# Patient Record
Sex: MALE | Race: WHITE | NOT HISPANIC OR LATINO | Employment: OTHER | URBAN - METROPOLITAN AREA
[De-identification: names, ages, dates, MRNs, and addresses within clinical notes are randomized per-mention and may not be internally consistent; named-entity substitution may affect disease eponyms.]

---

## 2024-01-19 ENCOUNTER — TELEPHONE (OUTPATIENT)
Dept: INTERNAL MEDICINE | Facility: CLINIC | Age: 74
End: 2024-01-19

## 2024-01-19 NOTE — TELEPHONE ENCOUNTER
Patient came into office to make a new patient appt, informed patient of next appt and would like to be seen sooner if possible due to medications. Patient states on medication for blood pressure and diabetes.

## 2024-02-08 ENCOUNTER — APPOINTMENT (OUTPATIENT)
Dept: GENERAL RADIOLOGY | Facility: HOSPITAL | Age: 74
End: 2024-02-08
Payer: MEDICARE

## 2024-02-08 ENCOUNTER — APPOINTMENT (OUTPATIENT)
Dept: CT IMAGING | Facility: HOSPITAL | Age: 74
End: 2024-02-08
Payer: MEDICARE

## 2024-02-08 ENCOUNTER — HOSPITAL ENCOUNTER (EMERGENCY)
Facility: HOSPITAL | Age: 74
Discharge: HOME | End: 2024-02-08
Attending: EMERGENCY MEDICINE | Admitting: EMERGENCY MEDICINE
Payer: MEDICARE

## 2024-02-08 VITALS
SYSTOLIC BLOOD PRESSURE: 168 MMHG | TEMPERATURE: 97.6 F | WEIGHT: 180 LBS | BODY MASS INDEX: 26.66 KG/M2 | RESPIRATION RATE: 16 BRPM | HEART RATE: 60 BPM | HEIGHT: 69 IN | OXYGEN SATURATION: 95 % | DIASTOLIC BLOOD PRESSURE: 86 MMHG

## 2024-02-08 DIAGNOSIS — R07.9 NONSPECIFIC CHEST PAIN: ICD-10-CM

## 2024-02-08 DIAGNOSIS — R40.0 SOMNOLENCE: Primary | ICD-10-CM

## 2024-02-08 LAB
ALBUMIN SERPL-MCNC: 4.2 G/DL (ref 3.5–5.2)
ALBUMIN/GLOB SERPL: 1.6 G/DL
ALP SERPL-CCNC: 38 U/L (ref 39–117)
ALT SERPL W P-5'-P-CCNC: 15 U/L (ref 1–41)
ANION GAP SERPL CALCULATED.3IONS-SCNC: 12.9 MMOL/L (ref 5–15)
AST SERPL-CCNC: 16 U/L (ref 1–40)
BASOPHILS # BLD AUTO: 0.04 10*3/MM3 (ref 0–0.2)
BASOPHILS NFR BLD AUTO: 0.6 % (ref 0–1.5)
BILIRUB SERPL-MCNC: 0.5 MG/DL (ref 0–1.2)
BILIRUB UR QL STRIP: NEGATIVE
BUN SERPL-MCNC: 22 MG/DL (ref 8–23)
BUN/CREAT SERPL: 24.7 (ref 7–25)
CALCIUM SPEC-SCNC: 9.6 MG/DL (ref 8.6–10.5)
CHLORIDE SERPL-SCNC: 104 MMOL/L (ref 98–107)
CLARITY UR: CLEAR
CO2 SERPL-SCNC: 25.1 MMOL/L (ref 22–29)
COLOR UR: ABNORMAL
CREAT SERPL-MCNC: 0.89 MG/DL (ref 0.76–1.27)
DEPRECATED RDW RBC AUTO: 39.8 FL (ref 37–54)
EGFRCR SERPLBLD CKD-EPI 2021: 91.6 ML/MIN/1.73
EOSINOPHIL # BLD AUTO: 0.11 10*3/MM3 (ref 0–0.4)
EOSINOPHIL NFR BLD AUTO: 1.7 % (ref 0.3–6.2)
ERYTHROCYTE [DISTWIDTH] IN BLOOD BY AUTOMATED COUNT: 12.5 % (ref 12.3–15.4)
GEN 5 2HR TROPONIN T REFLEX: 18 NG/L
GLOBULIN UR ELPH-MCNC: 2.7 GM/DL
GLUCOSE BLDC GLUCOMTR-MCNC: 150 MG/DL (ref 70–99)
GLUCOSE SERPL-MCNC: 145 MG/DL (ref 65–99)
GLUCOSE UR STRIP-MCNC: ABNORMAL MG/DL
HCT VFR BLD AUTO: 44 % (ref 37.5–51)
HGB BLD-MCNC: 15 G/DL (ref 13–17.7)
HGB UR QL STRIP.AUTO: NEGATIVE
HOLD SPECIMEN: NORMAL
HOLD SPECIMEN: NORMAL
IMM GRANULOCYTES # BLD AUTO: 0.01 10*3/MM3 (ref 0–0.05)
IMM GRANULOCYTES NFR BLD AUTO: 0.2 % (ref 0–0.5)
KETONES UR QL STRIP: NEGATIVE
LEUKOCYTE ESTERASE UR QL STRIP.AUTO: NEGATIVE
LIPASE SERPL-CCNC: 71 U/L (ref 13–60)
LYMPHOCYTES # BLD AUTO: 1.16 10*3/MM3 (ref 0.7–3.1)
LYMPHOCYTES NFR BLD AUTO: 18 % (ref 19.6–45.3)
MAGNESIUM SERPL-MCNC: 2 MG/DL (ref 1.6–2.4)
MCH RBC QN AUTO: 30.1 PG (ref 26.6–33)
MCHC RBC AUTO-ENTMCNC: 34.1 G/DL (ref 31.5–35.7)
MCV RBC AUTO: 88.2 FL (ref 79–97)
MONOCYTES # BLD AUTO: 0.49 10*3/MM3 (ref 0.1–0.9)
MONOCYTES NFR BLD AUTO: 7.6 % (ref 5–12)
NEUTROPHILS NFR BLD AUTO: 4.63 10*3/MM3 (ref 1.7–7)
NEUTROPHILS NFR BLD AUTO: 71.9 % (ref 42.7–76)
NITRITE UR QL STRIP: NEGATIVE
NRBC BLD AUTO-RTO: 0 /100 WBC (ref 0–0.2)
NT-PROBNP SERPL-MCNC: 354.7 PG/ML (ref 0–900)
PH UR STRIP.AUTO: 6 [PH] (ref 5–8)
PLATELET # BLD AUTO: 146 10*3/MM3 (ref 140–450)
PMV BLD AUTO: 10.8 FL (ref 6–12)
POTASSIUM SERPL-SCNC: 3.6 MMOL/L (ref 3.5–5.2)
PROT SERPL-MCNC: 6.9 G/DL (ref 6–8.5)
PROT UR QL STRIP: NEGATIVE
QT INTERVAL: 470 MS
QTC INTERVAL: 489 MS
RBC # BLD AUTO: 4.99 10*6/MM3 (ref 4.14–5.8)
SODIUM SERPL-SCNC: 142 MMOL/L (ref 136–145)
SP GR UR STRIP: 1.02 (ref 1–1.03)
TROPONIN T DELTA: -2 NG/L
TROPONIN T SERPL HS-MCNC: 20 NG/L
TSH SERPL DL<=0.05 MIU/L-ACNC: 0.55 UIU/ML (ref 0.27–4.2)
UROBILINOGEN UR QL STRIP: ABNORMAL
WBC NRBC COR # BLD AUTO: 6.44 10*3/MM3 (ref 3.4–10.8)
WHOLE BLOOD HOLD COAG: NORMAL
WHOLE BLOOD HOLD SPECIMEN: NORMAL

## 2024-02-08 PROCEDURE — 84443 ASSAY THYROID STIM HORMONE: CPT | Performed by: EMERGENCY MEDICINE

## 2024-02-08 PROCEDURE — 70450 CT HEAD/BRAIN W/O DYE: CPT

## 2024-02-08 PROCEDURE — 93005 ELECTROCARDIOGRAM TRACING: CPT

## 2024-02-08 PROCEDURE — 82948 REAGENT STRIP/BLOOD GLUCOSE: CPT

## 2024-02-08 PROCEDURE — 84484 ASSAY OF TROPONIN QUANT: CPT

## 2024-02-08 PROCEDURE — 25810000003 SODIUM CHLORIDE 0.9 % SOLUTION: Performed by: EMERGENCY MEDICINE

## 2024-02-08 PROCEDURE — 36415 COLL VENOUS BLD VENIPUNCTURE: CPT

## 2024-02-08 PROCEDURE — 71045 X-RAY EXAM CHEST 1 VIEW: CPT

## 2024-02-08 PROCEDURE — 85025 COMPLETE CBC W/AUTO DIFF WBC: CPT

## 2024-02-08 PROCEDURE — 93010 ELECTROCARDIOGRAM REPORT: CPT | Performed by: INTERNAL MEDICINE

## 2024-02-08 PROCEDURE — 83690 ASSAY OF LIPASE: CPT

## 2024-02-08 PROCEDURE — 83880 ASSAY OF NATRIURETIC PEPTIDE: CPT

## 2024-02-08 PROCEDURE — 81003 URINALYSIS AUTO W/O SCOPE: CPT | Performed by: EMERGENCY MEDICINE

## 2024-02-08 PROCEDURE — 93005 ELECTROCARDIOGRAM TRACING: CPT | Performed by: EMERGENCY MEDICINE

## 2024-02-08 PROCEDURE — 84484 ASSAY OF TROPONIN QUANT: CPT | Performed by: EMERGENCY MEDICINE

## 2024-02-08 PROCEDURE — 83735 ASSAY OF MAGNESIUM: CPT

## 2024-02-08 PROCEDURE — 99284 EMERGENCY DEPT VISIT MOD MDM: CPT

## 2024-02-08 PROCEDURE — 80053 COMPREHEN METABOLIC PANEL: CPT

## 2024-02-08 RX ORDER — GABAPENTIN 400 MG/1
800 CAPSULE ORAL ONCE
Status: COMPLETED | OUTPATIENT
Start: 2024-02-08 | End: 2024-02-08

## 2024-02-08 RX ORDER — SODIUM CHLORIDE 0.9 % (FLUSH) 0.9 %
10 SYRINGE (ML) INJECTION AS NEEDED
Status: DISCONTINUED | OUTPATIENT
Start: 2024-02-08 | End: 2024-02-08 | Stop reason: HOSPADM

## 2024-02-08 RX ORDER — ASPIRIN 81 MG/1
324 TABLET, CHEWABLE ORAL ONCE
Status: DISCONTINUED | OUTPATIENT
Start: 2024-02-08 | End: 2024-02-08 | Stop reason: HOSPADM

## 2024-02-08 RX ADMIN — GABAPENTIN 800 MG: 400 CAPSULE ORAL at 19:54

## 2024-02-08 RX ADMIN — SODIUM CHLORIDE 1000 ML: 9 INJECTION, SOLUTION INTRAVENOUS at 16:12

## 2024-02-08 NOTE — ED PROVIDER NOTES
Time: 3:37 PM EST  Date of encounter:  2024  Independent Historian/Clinical History and Information was obtained by:   Patient and Family    History is limited by: Altered Mental Status    Chief Complaint: Altered mental status, weakness and frequent falls      History of Present Illness:  Patient is a 73 y.o. year old male who presents to the emergency department for evaluation of altered mental status, weakness and frequent falls.  The patient's wife  1 year ago.  He is now staying with his daughter in the past month and all of the symptoms have been present the entire month.  Patient has a lot of chronic back pain and does take hydrocodone.  Daughter states he falls asleep frequently even during conversations.  Has generalized weakness and loss of muscle mass, slurring of his speech.    HPI    Patient Care Team  Primary Care Provider: Provider, No Known    Past Medical History:     Allergies   Allergen Reactions    Bee Venom Anaphylaxis    Morphine Anaphylaxis    Dilaudid [Hydromorphone Hcl] Delirium    Nsaids Hives    Tolmetin Unknown - Low Severity    Triprolidine-Pseudoephedrine Unknown - Low Severity     Past Medical History:   Diagnosis Date    Anemia     Chronic back pain     Diabetic neuropathy     Diverticulitis of colon     Esophageal reflux     Generalized anxiety disorder     Mixed hyperlipidemia     Rheumatoid arthritis     Syncope     Type 2 diabetes mellitus      Past Surgical History:   Procedure Laterality Date    INSERTION / REMOVAL EPIDURAL SPINAL NEUROSTIMULATOR       History reviewed. No pertinent family history.    Home Medications:  Prior to Admission medications    Not on File        Social History:   Social History     Tobacco Use    Smoking status: Former     Types: Cigarettes   Vaping Use    Vaping Use: Unknown   Substance Use Topics    Drug use: Defer         Review of Systems:  Review of Systems   Unable to perform ROS: Mental status change   Constitutional:  Positive for  "activity change and fatigue. Negative for fever.   HENT:  Negative for congestion.    Respiratory:  Negative for cough and shortness of breath.    Genitourinary:  Positive for difficulty urinating.   Musculoskeletal:  Positive for back pain.   Neurological:  Positive for speech difficulty and weakness.        Physical Exam:  /86   Pulse 60   Temp 97.6 °F (36.4 °C) (Oral)   Resp 16   Ht 175.3 cm (69\")   Wt 81.6 kg (180 lb)   SpO2 95%   BMI 26.58 kg/m²     Physical Exam  Vitals and nursing note reviewed.   Constitutional:       General: He is awake.      Appearance: Normal appearance. He is well-developed.      Comments: Patient is drowsy, consistently falling asleep during history and physical exam.   HENT:      Head: Normocephalic and atraumatic.      Nose: Nose normal.      Mouth/Throat:      Mouth: Mucous membranes are moist.   Eyes:      Extraocular Movements: Extraocular movements intact.      Pupils: Pupils are equal, round, and reactive to light.   Cardiovascular:      Rate and Rhythm: Normal rate and regular rhythm.      Heart sounds: Normal heart sounds.   Pulmonary:      Effort: Pulmonary effort is normal. No respiratory distress.      Breath sounds: Normal breath sounds. No wheezing, rhonchi or rales.   Abdominal:      General: Bowel sounds are normal.      Palpations: Abdomen is soft.      Tenderness: There is no abdominal tenderness. There is no guarding or rebound.      Comments: No rigidity   Musculoskeletal:         General: No tenderness. Normal range of motion.      Cervical back: Normal range of motion and neck supple.   Skin:     General: Skin is warm and dry.      Coloration: Skin is not jaundiced.   Neurological:      General: No focal deficit present.      Mental Status: Mental status is at baseline.                  Procedures:  Procedures      Medical Decision Making:      Comorbidities that affect care:    RA, type 2 diabetes, chronic back pain, generalized anxiety " disorder    External Notes reviewed:    Telephone Encounter: Telephone encounter 1/19/2024 with PMD, Dr. Rose.  Patient was calling in to try to get a quicker follow-up appointment for hypertension and diabetes.      The following orders were placed and all results were independently analyzed by me:  Orders Placed This Encounter   Procedures    XR Chest 1 View    CT Head Without Contrast    [place] Draw    High Sensitivity Troponin T    Comprehensive Metabolic Panel    Lipase    BNP    Magnesium    CBC Auto Differential    High Sensitivity Troponin T 2Hr    Urinalysis With Culture If Indicated - Urine, Clean Catch    TSH Rfx On Abnormal To Free T4    NPO Diet NPO Type: Strict NPO    Undress & Gown    Continuous Pulse Oximetry    Oxygen Therapy- Nasal Cannula; Titrate 1-6 LPM Per SpO2; 90 - 95%    POC Glucose Once    ECG 12 Lead ED Triage Standing Order; Chest Pain    ECG 12 Lead ED Triage Standing Order; Chest Pain    Insert Peripheral IV    CBC & Differential    Green Top (Gel)    Lavender Top    Gold Top - SST    Light Blue Top       Medications Given in the Emergency Department:  Medications   sodium chloride 0.9 % flush 10 mL (has no administration in time range)   aspirin chewable tablet 324 mg (324 mg Oral Not Given 2/8/24 1436)   gabapentin (NEURONTIN) capsule 800 mg (has no administration in time range)   sodium chloride 0.9 % bolus 1,000 mL (1,000 mL Intravenous New Bag 2/8/24 1612)        ED Course:    ED Course as of 02/08/24 1933   Thu Feb 08, 2024   1527 I have personally interpreted the EKG today and it shows no evidence of any acute ischemia or heart arrhythmia.  Right bundle branch block [RP]   1858 I have personally interpreted the EKG today and it shows no evidence of any acute ischemia or heart arrhythmia. [RP]   1931 Much more alert on reevaluation.  I did offer overnight observation but patient and family member at bedside are both comfortable going home knowing that we have ruled out  emergencies. [RP]      ED Course User Index  [RP] Glenn Cummins MD       Labs:    Lab Results (last 24 hours)       Procedure Component Value Units Date/Time    POC Glucose Once [[id number]]  (Abnormal) Collected: 02/08/24 1332    Specimen: Blood Updated: 02/08/24 1333     Glucose 150 mg/dL      Comment: Serial Number: [id number]:  [id number]       High Sensitivity Troponin T [[id number]]  (Normal) Collected: 02/08/24 1339    Specimen: Blood from Arm, Right Updated: 02/08/24 1417     HS Troponin T 20 ng/L     Narrative:      High Sensitive Troponin T Reference Range:  <14.0 ng/L- Negative Female for AMI  <22.0 ng/L- Negative Male for AMI  >=14 - Abnormal Female indicating possible myocardial injury.  >=22 - Abnormal Male indicating possible myocardial injury.   Clinicians would have to utilize clinical acumen, EKG, Troponin, and serial changes to determine if it is an Acute Myocardial Infarction or myocardial injury due to an underlying chronic condition.         CBC & Differential [[id number]]  (Abnormal) Collected: 02/08/24 1339    Specimen: Blood from Arm, Right Updated: 02/08/24 1352    Narrative:      The following orders were created for panel order CBC & Differential.  Procedure                               Abnormality         Status                     ---------                               -----------         ------                     CBC Auto Differential[[id number]]        Abnormal            Final result                 Please view results for these tests on the individual orders.    Comprehensive Metabolic Panel [[id number]]  (Abnormal) Collected: 02/08/24 1339    Specimen: Blood from Arm, Right Updated: 02/08/24 1417     Glucose 145 mg/dL      BUN 22 mg/dL      Creatinine 0.89 mg/dL      Sodium 142 mmol/L      Potassium 3.6 mmol/L      Chloride 104 mmol/L      CO2 25.1 mmol/L      Calcium 9.6 mg/dL      Total Protein 6.9 g/dL      Albumin 4.2 g/dL      ALT (SGPT) 15 U/L      AST (SGOT) 16 U/L       Alkaline Phosphatase 38 U/L      Total Bilirubin 0.5 mg/dL      Globulin 2.7 gm/dL      A/G Ratio 1.6 g/dL      BUN/Creatinine Ratio 24.7     Anion Gap 12.9 mmol/L      eGFR 91.6 mL/min/1.73     Narrative:      GFR Normal >60  Chronic Kidney Disease <60  Kidney Failure <15    The GFR formula is only valid for adults with stable renal function between ages 18 and 70.    Lipase [[id number]]  (Abnormal) Collected: 02/08/24 1339    Specimen: Blood from Arm, Right Updated: 02/08/24 1417     Lipase 71 U/L     BNP [[id number]]  (Normal) Collected: 02/08/24 1339    Specimen: Blood from Arm, Right Updated: 02/08/24 1414     proBNP 354.7 pg/mL     Narrative:      This assay is used as an aid in the diagnosis of individuals suspected of having heart failure. It can be used as an aid in the diagnosis of acute decompensated heart failure (ADHF) in patients presenting with signs and symptoms of ADHF to the emergency department (ED). In addition, NT-proBNP of <300 pg/mL indicates ADHF is not likely.    Age Range Result Interpretation  NT-proBNP Concentration (pg/mL:      <50             Positive            >450                   Gray                 300-450                    Negative             <300    50-75           Positive            >900                  Gray                300-900                  Negative            <300      >75             Positive            >1800                  Gray                300-1800                  Negative            <300    Magnesium [[id number]]  (Normal) Collected: 02/08/24 1339    Specimen: Blood from Arm, Right Updated: 02/08/24 1417     Magnesium 2.0 mg/dL     CBC Auto Differential [[id number]]  (Abnormal) Collected: 02/08/24 1339    Specimen: Blood from Arm, Right Updated: 02/08/24 1352     WBC 6.44 10*3/mm3      RBC 4.99 10*6/mm3      Hemoglobin 15.0 g/dL      Hematocrit 44.0 %      MCV 88.2 fL      MCH 30.1 pg      MCHC 34.1 g/dL      RDW 12.5 %      RDW-SD 39.8 fl      MPV 10.8 fL       Platelets 146 10*3/mm3      Neutrophil % 71.9 %      Lymphocyte % 18.0 %      Monocyte % 7.6 %      Eosinophil % 1.7 %      Basophil % 0.6 %      Immature Grans % 0.2 %      Neutrophils, Absolute 4.63 10*3/mm3      Lymphocytes, Absolute 1.16 10*3/mm3      Monocytes, Absolute 0.49 10*3/mm3      Eosinophils, Absolute 0.11 10*3/mm3      Basophils, Absolute 0.04 10*3/mm3      Immature Grans, Absolute 0.01 10*3/mm3      nRBC 0.0 /100 WBC     TSH Rfx On Abnormal To Free T4 [[id number]]  (Normal) Collected: 02/08/24 1339    Specimen: Blood from Arm, Right Updated: 02/08/24 1602     TSH 0.545 uIU/mL     High Sensitivity Troponin T 2Hr [[id number]]  (Normal) Collected: 02/08/24 1615    Specimen: Blood from Arm, Right Updated: 02/08/24 1640     HS Troponin T 18 ng/L      Troponin T Delta -2 ng/L     Narrative:      High Sensitive Troponin T Reference Range:  <14.0 ng/L- Negative Female for AMI  <22.0 ng/L- Negative Male for AMI  >=14 - Abnormal Female indicating possible myocardial injury.  >=22 - Abnormal Male indicating possible myocardial injury.   Clinicians would have to utilize clinical acumen, EKG, Troponin, and serial changes to determine if it is an Acute Myocardial Infarction or myocardial injury due to an underlying chronic condition.         Urinalysis With Culture If Indicated - Urine, Clean Catch [[id number]]  (Abnormal) Collected: 02/08/24 1815    Specimen: Urine, Clean Catch Updated: 02/08/24 1839     Color, UA Dark Yellow     Appearance, UA Clear     pH, UA 6.0     Specific Gravity, UA 1.025     Glucose, UA >=1000 mg/dL (3+)     Ketones, UA Negative     Bilirubin, UA Negative     Blood, UA Negative     Protein, UA Negative     Leuk Esterase, UA Negative     Nitrite, UA Negative     Urobilinogen, UA 1.0 E.U./dL    Narrative:      In absence of clinical symptoms, the presence of pyuria, bacteria, and/or nitrites on the urinalysis result does not correlate with infection.  Urine microscopic not indicated.              Imaging:    CT Head Without Contrast    Result Date: 2/8/2024  PROCEDURE: CT HEAD WO CONTRAST  COMPARISON:  None INDICATIONS: headache, multiple falls  PROTOCOL:   Standard imaging protocol performed    RADIATION:   DLP: 1017.2 mGy*cm   MA and/or KV was adjusted to minimize radiation dose.     TECHNIQUE: After obtaining the patient's consent, CT images were obtained without non-ionic intravenous contrast material.  FINDINGS:  Right frontal scalp hematoma.  No skull fracture.  No blood in the visualized paranasal sinuses/middle ear cavities.  No intraorbital hematoma.  Intracranially, no hemorrhage, edema, or mass effect.  Mild chronic small vessel ischemic white matter changes, and small old right basal ganglia lacunar infarct       No acute intracranial process     ZOYA SYKES MD       Electronically Signed and Approved By: ZOYA SYKES MD on 2/08/2024 at 16:40             XR Chest 1 View    Result Date: 2/8/2024  PROCEDURE: XR CHEST 1 VW  COMPARISON: None  INDICATIONS: Chest Pain Triage Protocol/chest pain and fatigue  FINDINGS:  Heart size and pulmonary vasculature within normal limits.  Thoracic spinal cord stimulator noted.  Lungs clear.  Costophrenic angles sharp       No active cardiopulmonary disease       ZOYA SYKES MD       Electronically Signed and Approved By: ZOYA SYKES MD on 2/08/2024 at 13:53                Differential Diagnosis and Discussion:    Weakness: Based on the patient's history, signs, and symptoms, the diffential diagnosis includes but is not limited to meningitis, stroke, sepsis, subarachnoid hemorrhage, intracranial bleeding, encephalitis, acute uti, dehydration, MS, myasthenia gravis, Guillan [place], migraine variant, neuromuscular disorders vertigo, electrolyte imbalance, and metabolic disorders.    All labs were reviewed and interpreted by me.  All X-rays impressions were independently interpreted by me.  EKG was interpreted by me.  CT scan radiology impression was  interpreted by me.    MDM     Amount and/or Complexity of Data Reviewed  Clinical lab tests: reviewed  Tests in the radiology section of CPT®: reviewed  Tests in the medicine section of CPT®: reviewed                 Patient Care Considerations:    MRI: I considered ordering an MRI however patient is much improved on reevaluation and at no point had any complaints of unilateral numbness tingling or focal weakness.  His history/physical exam are not consistent with CVA.      Consultants/Shared Management Plan:    None    Social Determinants of Health:    Patient is independent, reliable, and has access to care.       Disposition and Care Coordination:    Discharged: I considered escalation of care by admitting this patient to the hospital, however the patient has improved and is suitable and  stable for discharge.    I have explained the patient´s condition, diagnoses and treatment plan based on the information available to me at this time. I have answered questions and addressed any concerns. The patient has a good  understanding of the patient´s diagnosis, condition, and treatment plan as can be expected at this point. The vital signs have been stable. The patient´s condition is stable and appropriate for discharge from the emergency department.      The patient will pursue further outpatient evaluation with the primary care physician or other designated or consulting physician as outlined in the discharge instructions. They are agreeable to this plan of care and follow-up instructions have been explained in detail. The patient has received these instructions in written format and has expressed an understanding of the discharge instructions. The patient is aware that any significant change in condition or worsening of symptoms should prompt an immediate return to this or the closest emergency department or call to 911.    Final diagnoses:   Somnolence   Nonspecific chest pain        ED Disposition       ED  Disposition   Discharge    Condition   Stable    Comment   --               This medical record created using voice recognition software.             Glenn Cummins MD  02/08/24 2285

## 2024-02-09 LAB
QT INTERVAL: 435 MS
QTC INTERVAL: 444 MS

## 2024-02-09 NOTE — DISCHARGE INSTRUCTIONS
Return to the emergency department immediately for one-sided numbness tingling or loss of strength, worsening chest pain.  Stay well-hydrated.  Follow-up your primary care provider to discuss further outpatient testing or specialist referral for these issues.

## 2024-02-22 ENCOUNTER — OFFICE VISIT (OUTPATIENT)
Dept: INTERNAL MEDICINE | Facility: CLINIC | Age: 74
End: 2024-02-22
Payer: MEDICARE

## 2024-02-22 ENCOUNTER — PATIENT ROUNDING (BHMG ONLY) (OUTPATIENT)
Dept: INTERNAL MEDICINE | Facility: CLINIC | Age: 74
End: 2024-02-22
Payer: MEDICARE

## 2024-02-22 VITALS
HEART RATE: 78 BPM | WEIGHT: 179 LBS | OXYGEN SATURATION: 94 % | SYSTOLIC BLOOD PRESSURE: 130 MMHG | BODY MASS INDEX: 26.51 KG/M2 | TEMPERATURE: 97.3 F | DIASTOLIC BLOOD PRESSURE: 84 MMHG | HEIGHT: 69 IN

## 2024-02-22 DIAGNOSIS — N40.1 BPH ASSOCIATED WITH NOCTURIA: ICD-10-CM

## 2024-02-22 DIAGNOSIS — E11.43 AUTONOMIC DYSFUNCTION WITH TYPE 2 DIABETES MELLITUS: ICD-10-CM

## 2024-02-22 DIAGNOSIS — F32.A DEPRESSION, UNSPECIFIED DEPRESSION TYPE: ICD-10-CM

## 2024-02-22 DIAGNOSIS — R35.1 BPH ASSOCIATED WITH NOCTURIA: ICD-10-CM

## 2024-02-22 DIAGNOSIS — R41.3 MEMORY PROBLEM: ICD-10-CM

## 2024-02-22 DIAGNOSIS — G47.00 INSOMNIA, UNSPECIFIED TYPE: ICD-10-CM

## 2024-02-22 DIAGNOSIS — E11.65 TYPE 2 DIABETES MELLITUS WITH HYPERGLYCEMIA, UNSPECIFIED WHETHER LONG TERM INSULIN USE: ICD-10-CM

## 2024-02-22 DIAGNOSIS — I95.9 HYPOTENSION, UNSPECIFIED HYPOTENSION TYPE: ICD-10-CM

## 2024-02-22 DIAGNOSIS — G47.33 OSA (OBSTRUCTIVE SLEEP APNEA): ICD-10-CM

## 2024-02-22 DIAGNOSIS — Z76.89 ESTABLISHING CARE WITH NEW DOCTOR, ENCOUNTER FOR: Primary | ICD-10-CM

## 2024-02-22 DIAGNOSIS — M54.41 CHRONIC BILATERAL LOW BACK PAIN WITH BILATERAL SCIATICA: ICD-10-CM

## 2024-02-22 DIAGNOSIS — K21.9 GASTROESOPHAGEAL REFLUX DISEASE, UNSPECIFIED WHETHER ESOPHAGITIS PRESENT: ICD-10-CM

## 2024-02-22 DIAGNOSIS — Z72.0 CURRENT EVERY DAY NICOTINE VAPING: ICD-10-CM

## 2024-02-22 DIAGNOSIS — G89.29 CHRONIC BILATERAL LOW BACK PAIN WITH BILATERAL SCIATICA: ICD-10-CM

## 2024-02-22 DIAGNOSIS — R54 AGE-RELATED PHYSICAL DEBILITY: ICD-10-CM

## 2024-02-22 DIAGNOSIS — E66.3 OVERWEIGHT (BMI 25.0-29.9): ICD-10-CM

## 2024-02-22 DIAGNOSIS — I51.89 DIASTOLIC DYSFUNCTION: ICD-10-CM

## 2024-02-22 DIAGNOSIS — Z79.899 MEDICATION MANAGEMENT: ICD-10-CM

## 2024-02-22 DIAGNOSIS — M54.42 CHRONIC BILATERAL LOW BACK PAIN WITH BILATERAL SCIATICA: ICD-10-CM

## 2024-02-22 DIAGNOSIS — E78.5 HYPERLIPIDEMIA, UNSPECIFIED HYPERLIPIDEMIA TYPE: ICD-10-CM

## 2024-02-22 DIAGNOSIS — Z91.030 BEE STING ALLERGY: ICD-10-CM

## 2024-02-22 DIAGNOSIS — R29.6 FREQUENT FALLS: ICD-10-CM

## 2024-02-22 PROBLEM — I10 ESSENTIAL HYPERTENSION: Status: RESOLVED | Noted: 2024-02-22 | Resolved: 2024-02-22

## 2024-02-22 PROBLEM — I10 ESSENTIAL HYPERTENSION: Status: ACTIVE | Noted: 2024-02-22

## 2024-02-22 LAB
ALBUMIN SERPL-MCNC: 4.8 G/DL (ref 3.5–5.2)
ALBUMIN UR-MCNC: 5 MG/DL
ALBUMIN/GLOB SERPL: 2.2 G/DL
ALP SERPL-CCNC: 41 U/L (ref 39–117)
ALT SERPL W P-5'-P-CCNC: 14 U/L (ref 1–41)
AMPHET+METHAMPHET UR QL: NEGATIVE
AMPHETAMINE INTERNAL CONTROL: ABNORMAL
AMPHETAMINES UR QL: NEGATIVE
ANION GAP SERPL CALCULATED.3IONS-SCNC: 12 MMOL/L (ref 5–15)
AST SERPL-CCNC: 21 U/L (ref 1–40)
BARBITURATE INTERNAL CONTROL: ABNORMAL
BARBITURATES UR QL SCN: NEGATIVE
BENZODIAZ UR QL SCN: NEGATIVE
BENZODIAZEPINE INTERNAL CONTROL: ABNORMAL
BILIRUB SERPL-MCNC: 0.3 MG/DL (ref 0–1.2)
BUN SERPL-MCNC: 29 MG/DL (ref 8–23)
BUN/CREAT SERPL: 25 (ref 7–25)
BUPRENORPHINE INTERNAL CONTROL: ABNORMAL
BUPRENORPHINE SERPL-MCNC: NEGATIVE NG/ML
CALCIUM SPEC-SCNC: 9.6 MG/DL (ref 8.6–10.5)
CANNABINOIDS SERPL QL: NEGATIVE
CHLORIDE SERPL-SCNC: 106 MMOL/L (ref 98–107)
CO2 SERPL-SCNC: 25 MMOL/L (ref 22–29)
COCAINE INTERNAL CONTROL: ABNORMAL
COCAINE UR QL: NEGATIVE
CREAT SERPL-MCNC: 1.16 MG/DL (ref 0.76–1.27)
CREAT UR-MCNC: 88.5 MG/DL
EGFRCR SERPLBLD CKD-EPI 2021: 67.3 ML/MIN/1.73
EXPIRATION DATE: ABNORMAL
GLOBULIN UR ELPH-MCNC: 2.2 GM/DL
GLUCOSE SERPL-MCNC: 114 MG/DL (ref 65–99)
HBA1C MFR BLD: 6.7 % (ref 4.8–5.6)
Lab: ABNORMAL
MDMA (ECSTASY) INTERNAL CONTROL: ABNORMAL
MDMA UR QL SCN: NEGATIVE
METHADONE INTERNAL CONTROL: ABNORMAL
METHADONE UR QL SCN: NEGATIVE
METHAMPHETAMINE INTERNAL CONTROL: ABNORMAL
MICROALBUMIN/CREAT UR: 56.5 MG/G (ref 0–29)
MORPHINE INTERNAL CONTROL: ABNORMAL
MORPHINE/OPIATES SCREEN, URINE: POSITIVE
OXYCODONE INTERNAL CONTROL: ABNORMAL
OXYCODONE UR QL SCN: NEGATIVE
PCP UR QL SCN: NEGATIVE
PHENCYCLIDINE INTERNAL CONTROL: ABNORMAL
POTASSIUM SERPL-SCNC: 4.7 MMOL/L (ref 3.5–5.2)
PROT SERPL-MCNC: 7 G/DL (ref 6–8.5)
SODIUM SERPL-SCNC: 143 MMOL/L (ref 136–145)
THC INTERNAL CONTROL: ABNORMAL

## 2024-02-22 PROCEDURE — 83036 HEMOGLOBIN GLYCOSYLATED A1C: CPT | Performed by: STUDENT IN AN ORGANIZED HEALTH CARE EDUCATION/TRAINING PROGRAM

## 2024-02-22 PROCEDURE — 80053 COMPREHEN METABOLIC PANEL: CPT | Performed by: STUDENT IN AN ORGANIZED HEALTH CARE EDUCATION/TRAINING PROGRAM

## 2024-02-22 PROCEDURE — 82570 ASSAY OF URINE CREATININE: CPT | Performed by: STUDENT IN AN ORGANIZED HEALTH CARE EDUCATION/TRAINING PROGRAM

## 2024-02-22 PROCEDURE — 82043 UR ALBUMIN QUANTITATIVE: CPT | Performed by: STUDENT IN AN ORGANIZED HEALTH CARE EDUCATION/TRAINING PROGRAM

## 2024-02-22 RX ORDER — EPINEPHRINE 0.15 MG/.3ML
INJECTION INTRAMUSCULAR
COMMUNITY
End: 2024-02-22

## 2024-02-22 RX ORDER — GABAPENTIN 800 MG/1
800 TABLET ORAL 3 TIMES DAILY
COMMUNITY
End: 2024-02-22 | Stop reason: SDUPTHER

## 2024-02-22 RX ORDER — FENOFIBRATE 160 MG/1
160 TABLET ORAL DAILY
Qty: 90 TABLET | Refills: 2 | Status: SHIPPED | OUTPATIENT
Start: 2024-02-22

## 2024-02-22 RX ORDER — GABAPENTIN 800 MG/1
800 TABLET ORAL 3 TIMES DAILY
Qty: 90 TABLET | Refills: 2 | Status: SHIPPED | OUTPATIENT
Start: 2024-02-22

## 2024-02-22 RX ORDER — NALOXONE HYDROCHLORIDE 4 MG/.1ML
1 SPRAY NASAL AS NEEDED
COMMUNITY
End: 2024-02-22 | Stop reason: SDUPTHER

## 2024-02-22 RX ORDER — FOLIC ACID 1 MG/1
1 TABLET ORAL DAILY
Qty: 90 TABLET | Refills: 2 | Status: SHIPPED | OUTPATIENT
Start: 2024-02-22

## 2024-02-22 RX ORDER — HYDROCODONE BITARTRATE AND ACETAMINOPHEN 10; 325 MG/1; MG/1
1 TABLET ORAL EVERY 6 HOURS PRN
Qty: 30 TABLET | Refills: 0 | Status: SHIPPED | OUTPATIENT
Start: 2024-02-22

## 2024-02-22 RX ORDER — FOLIC ACID 1 MG/1
1 TABLET ORAL DAILY
COMMUNITY
End: 2024-02-22 | Stop reason: SDUPTHER

## 2024-02-22 RX ORDER — MIDODRINE HYDROCHLORIDE 2.5 MG/1
2.5 TABLET ORAL
COMMUNITY
End: 2024-02-22 | Stop reason: SDUPTHER

## 2024-02-22 RX ORDER — LEVOCETIRIZINE DIHYDROCHLORIDE 5 MG/1
5 TABLET, FILM COATED ORAL EVERY EVENING
COMMUNITY

## 2024-02-22 RX ORDER — TIZANIDINE 4 MG/1
6 TABLET ORAL NIGHTLY PRN
COMMUNITY
End: 2024-02-22 | Stop reason: SDUPTHER

## 2024-02-22 RX ORDER — PANTOPRAZOLE SODIUM 40 MG/1
40 TABLET, DELAYED RELEASE ORAL DAILY
Qty: 90 TABLET | Refills: 2 | Status: SHIPPED | OUTPATIENT
Start: 2024-02-22

## 2024-02-22 RX ORDER — ZOLPIDEM TARTRATE 12.5 MG/1
12.5 TABLET, FILM COATED, EXTENDED RELEASE ORAL NIGHTLY PRN
Qty: 30 TABLET | Refills: 2 | Status: SHIPPED | OUTPATIENT
Start: 2024-02-22

## 2024-02-22 RX ORDER — SILDENAFIL 25 MG/1
25 TABLET, FILM COATED ORAL DAILY PRN
COMMUNITY

## 2024-02-22 RX ORDER — FENOFIBRATE 160 MG/1
160 TABLET ORAL DAILY
COMMUNITY
End: 2024-02-22 | Stop reason: SDUPTHER

## 2024-02-22 RX ORDER — NALOXONE HYDROCHLORIDE 4 MG/.1ML
1 SPRAY NASAL AS NEEDED
Qty: 2 EACH | Refills: 2 | Status: SHIPPED | OUTPATIENT
Start: 2024-02-22

## 2024-02-22 RX ORDER — ROSUVASTATIN CALCIUM 10 MG/1
10 TABLET, COATED ORAL DAILY
Qty: 90 TABLET | Refills: 2 | Status: SHIPPED | OUTPATIENT
Start: 2024-02-22

## 2024-02-22 RX ORDER — MIDODRINE HYDROCHLORIDE 2.5 MG/1
2.5 TABLET ORAL
Qty: 270 TABLET | Refills: 2 | Status: SHIPPED | OUTPATIENT
Start: 2024-02-22

## 2024-02-22 RX ORDER — DULAGLUTIDE 3 MG/.5ML
4.5 INJECTION, SOLUTION SUBCUTANEOUS WEEKLY
Qty: 0.5 ML | Refills: 3 | Status: SHIPPED | OUTPATIENT
Start: 2024-02-22

## 2024-02-22 RX ORDER — EPINEPHRINE 0.3 MG/.3ML
0.3 INJECTION SUBCUTANEOUS ONCE
Qty: 1 EACH | Refills: 1 | Status: SHIPPED | OUTPATIENT
Start: 2024-02-22 | End: 2024-02-22

## 2024-02-22 RX ORDER — PANTOPRAZOLE SODIUM 40 MG/1
40 TABLET, DELAYED RELEASE ORAL DAILY
COMMUNITY
End: 2024-02-22 | Stop reason: SDUPTHER

## 2024-02-22 RX ORDER — ROSUVASTATIN CALCIUM 10 MG/1
10 TABLET, COATED ORAL DAILY
COMMUNITY
End: 2024-02-22 | Stop reason: SDUPTHER

## 2024-02-22 RX ORDER — PAROXETINE HYDROCHLORIDE 40 MG/1
40 TABLET, FILM COATED ORAL EVERY MORNING
Qty: 90 TABLET | Refills: 2 | Status: SHIPPED | OUTPATIENT
Start: 2024-02-22

## 2024-02-22 RX ORDER — HYDROCODONE BITARTRATE AND ACETAMINOPHEN 10; 325 MG/1; MG/1
1 TABLET ORAL EVERY 6 HOURS PRN
COMMUNITY
End: 2024-02-22 | Stop reason: SDUPTHER

## 2024-02-22 RX ORDER — MULTIPLE VITAMINS W/ MINERALS TAB 9MG-400MCG
1 TAB ORAL DAILY
COMMUNITY

## 2024-02-22 RX ORDER — CLONAZEPAM 1 MG/1
1 TABLET ORAL 2 TIMES DAILY PRN
COMMUNITY
End: 2024-02-22

## 2024-02-22 RX ORDER — PAROXETINE HYDROCHLORIDE 20 MG/1
40 TABLET, FILM COATED ORAL EVERY MORNING
COMMUNITY
End: 2024-02-22 | Stop reason: SDUPTHER

## 2024-02-22 RX ORDER — DULAGLUTIDE 3 MG/.5ML
5 INJECTION, SOLUTION SUBCUTANEOUS
COMMUNITY
End: 2024-02-22 | Stop reason: SDUPTHER

## 2024-02-22 RX ORDER — TIZANIDINE 4 MG/1
6 TABLET ORAL NIGHTLY PRN
Qty: 90 TABLET | Refills: 2 | Status: SHIPPED | OUTPATIENT
Start: 2024-02-22

## 2024-02-22 NOTE — PROGRESS NOTES
Chief Complaint  Establish Care, Hypertension, Diabetes, and Memory Loss    Previous PCP: Von Rodriguez ([facility])  Specialist(s): Neurosurgery, pain management, urology  COVID vaccine: no  Pneumonia vaccine: 11/2023  Shingles vaccine:   Colon cancer screening: about 2 yrs ago    Here with granddaughter and family friend.    Historian: granddaughter, with supplemental history from Mr. Zachary Sharma is a 72 yo M with a history of chronic back pain, frequent falls, hypotension on midodrine, and T2DM who is here to establish care.  Of note, his daughter reports her family is in the [employer], and they plan on moving in June. He was not in  service, and worked as a .  He has been living with his granddaughter since January.  His wife passed away last year, and he was unable to take care of himself at home.    Regarding his back pain, he reports having had 5 back operations.  He has 2 rods and 6 screws.  He previously followed with pain management.  He has been evaluated by NS, who recommended against further surgeries.  He does have a stimulator in his back.  He has recently started experimenting with delta 8, which I recommended against at this time.    Mr. Sharma is on midodrine due to chronic hypotension.  He does not drive, though has a license.  His most recent syncopal episode was this month, after a long car drive.  Of note, he was seen in ER 2/8/24 due to elevated home BP's in the systolic 200s plus AMS.  Evaluation was reassuring, though previous lacunar infarct noted on CT.  Both Mr. Sharma as well as his granddaughter deny that there could be any possibility that he took too much of his midodrine.  His granddaughter does report that his memory is poor, and that he becomes confused at times.    Mr. Sharma noted to be on clonazepam.  He reports that he only uses it at night to help him sleep.  He and his granddaughter are agreeable to at trial of ambien to replace.    He is on paroxetine for  decades for a history of depression.  He reports that he has his down moments but overall doing well.  He denies SI and denies any previous attempts.    He has a previous history of MIKE, and not currently using a CPAP.    Although he quit smoking 30 years ago, since January he has taken up the habit of vaping nicotine containing fluid from his granddaughter.    Mr. Sharma has no known history of MI or cancer.  Evidence of previous lacunar infarct noted on CT from ER visit for AMS this year.    PHQ-9 Depression Screening  Little interest or pleasure in doing things?     Feeling down, depressed, or hopeless?     Trouble falling or staying asleep, or sleeping too much?     Feeling tired or having little energy?     Poor appetite or overeating?     Feeling bad about yourself - or that you are a failure or have let yourself or your family down?     Trouble concentrating on things, such as reading the newspaper or watching television?     Moving or speaking so slowly that other people could have noticed? Or the opposite - being so fidgety or restless that you have been moving around a lot more than usual?     Thoughts that you would be better off dead, or of hurting yourself in some way?     PHQ-9 Total Score     If you checked off any problems, how difficult have these problems made it for you to do your work, take care of things at home, or get along with other people?           Subjective          Hakeem Sharma presents to [facility]  History of Present Illness      Current Outpatient Medications   Medication Instructions    dapagliflozin Propanediol (FARXIGA) 10 mg, Oral, Daily    EPINEPHrine (EPIPEN 2-JEROMY) 0.3 mg, Intramuscular, Once    fenofibrate 160 mg, Oral, Daily    folic acid (FOLVITE) 1 mg, Oral, Daily    gabapentin (NEURONTIN) 800 mg, Oral, 3 Times Daily    HYDROcodone-acetaminophen (NORCO)  MG per tablet 1 tablet, Oral, Every 6 Hours PRN     "levocetirizine (XYZAL) 5 mg, Every Evening    midodrine (PROAMATINE) 2.5 mg, Oral, 3 Times Daily Before Meals    multivitamin with minerals tablet tablet 1 tablet, Oral, Daily    naloxone (NARCAN) 4 MG/0.1ML nasal spray 1 spray, Nasal, As Needed    pantoprazole (PROTONIX) 40 mg, Oral, Daily    PARoxetine (PAXIL) 40 mg, Oral, Every Morning    rosuvastatin (CRESTOR) 10 mg, Oral, Daily    sildenafil (VIAGRA) 25 mg, Oral, Daily PRN    tiZANidine (ZANAFLEX) 6 mg, Oral, Nightly PRN    Trulicity 4.5 mg, Subcutaneous, Weekly    zolpidem CR (AMBIEN CR) 12.5 mg, Oral, Nightly PRN       The following portions of the patient's history were reviewed and updated as appropriate: allergies, current medications, past family history, past medical history, past social history, past surgical history, and problem list.    Objective   Vital Signs:   /84 (BP Location: Left arm, Patient Position: Sitting, Cuff Size: Adult)   Pulse 78   Temp 97.3 °F (36.3 °C) (Temporal)   Ht 175.3 cm (69\")   Wt 81.2 kg (179 lb)   SpO2 94%   BMI 26.43 kg/m²     BP Readings from Last 3 Encounters:   02/22/24 130/84   02/08/24 168/86     Wt Readings from Last 3 Encounters:   02/22/24 81.2 kg (179 lb)   02/08/24 81.6 kg (180 lb)     Physical Exam  Vitals reviewed.   Constitutional:       General: He is not in acute distress.     Appearance: Normal appearance. He is not ill-appearing, toxic-appearing or diaphoretic.   HENT:      Head: Normocephalic and atraumatic.      Right Ear: External ear normal.      Left Ear: External ear normal.   Eyes:      Conjunctiva/sclera: Conjunctivae normal.   Cardiovascular:      Rate and Rhythm: Normal rate and regular rhythm.      Pulses: Normal pulses.      Heart sounds: Normal heart sounds. No murmur heard.     No friction rub. No gallop.   Pulmonary:      Effort: Pulmonary effort is normal. No respiratory distress.      Breath sounds: Normal breath sounds. No stridor. No wheezing, rhonchi or rales.   Chest:      " Chest wall: No tenderness.   Abdominal:      General: Abdomen is flat.      Palpations: Abdomen is soft. There is no mass.      Tenderness: There is no abdominal tenderness.   Musculoskeletal:      Right lower leg: No edema.      Left lower leg: No edema.   Skin:     General: Skin is warm and dry.   Neurological:      General: No focal deficit present.      Mental Status: He is alert. Mental status is at baseline.      Gait: Gait abnormal.      Comments: Ambulates with cane.  Short steps, almost shuffling gait noted.  Leans towards left site.   Psychiatric:         Behavior: Behavior normal.         Thought Content: Thought content normal.         Judgment: Judgment normal.          Result Review :   The following data was reviewed by: Heriberto Rose MD on 02/22/2024:  Common labs          2/8/2024    13:39   Common Labs   Glucose 145    BUN 22    Creatinine 0.89    Sodium 142    Potassium 3.6    Chloride 104    Calcium 9.6    Albumin 4.2    Total Bilirubin 0.5    Alkaline Phosphatase 38    AST (SGOT) 16    ALT (SGPT) 15    WBC 6.44    Hemoglobin 15.0    Hematocrit 44.0    Platelets 146             Lab Results   Component Value Date    BILIRUBINUR Negative 02/08/2024       Procedures        Assessment and Plan    Diagnoses and all orders for this visit:    1. Establishing care with new doctor, encounter for (Primary)    2. Type 2 diabetes mellitus with hyperglycemia, unspecified whether long term insulin use  -     Comprehensive Metabolic Panel  -     Hemoglobin A1c  -     Microalbumin / Creatinine Urine Ratio - Urine, Clean Catch  -     dapagliflozin Propanediol (Farxiga) 10 MG tablet; Take 10 mg by mouth Daily.  Dispense: 90 tablet; Refill: 2  -     Dulaglutide (Trulicity) 3 MG/0.5ML solution pen-injector; Inject 0.75 mL under the skin into the appropriate area as directed 1 (One) Time Per Week.  Dispense: 0.5 mL; Refill: 3    3. Overweight (BMI 25.0-29.9)    4. Chronic bilateral low back pain with bilateral  sciatica  -     POC Medline 12 Panel Urine Drug Screen  -     gabapentin (NEURONTIN) 800 MG tablet; Take 1 tablet by mouth 3 (Three) Times a Day.  Dispense: 90 tablet; Refill: 2  -     HYDROcodone-acetaminophen (NORCO)  MG per tablet; Take 1 tablet by mouth Every 6 (Six) Hours As Needed for Moderate Pain.  Dispense: 30 tablet; Refill: 0  -     naloxone (NARCAN) 4 MG/0.1ML nasal spray; 1 spray into the nostril(s) as directed by provider As Needed for Opioid Reversal.  Dispense: 2 each; Refill: 2  -     tiZANidine (ZANAFLEX) 4 MG tablet; Take 1.5 tablets by mouth At Night As Needed for Muscle Spasms.  Dispense: 90 tablet; Refill: 2    5. Autonomic dysfunction with type 2 diabetes mellitus    6. Hyperlipidemia, unspecified hyperlipidemia type  -     fenofibrate 160 MG tablet; Take 1 tablet by mouth Daily.  Dispense: 90 tablet; Refill: 2  -     rosuvastatin (CRESTOR) 10 MG tablet; Take 1 tablet by mouth Daily.  Dispense: 90 tablet; Refill: 2    7. Frequent falls  -     Ambulatory Referral to Physical Therapy Evaluate and treat    8. Age-related physical debility  -     Ambulatory Referral to Physical Therapy Evaluate and treat    9. Diastolic dysfunction  -     Ambulatory Referral to Cardiology    10. Medication management  -     POC Medline 12 Panel Urine Drug Screen  -     Ambulatory Referral to Pain Management    11. Current every day nicotine vaping    12. Hypotension, unspecified hypotension type  -     Ambulatory Referral to Cardiology  -     midodrine (PROAMATINE) 2.5 MG tablet; Take 1 tablet by mouth 3 (Three) Times a Day Before Meals.  Dispense: 270 tablet; Refill: 2    13. Memory problem    14. Insomnia, unspecified type  -     zolpidem CR (Ambien CR) 12.5 MG CR tablet; Take 1 tablet by mouth At Night As Needed for Sleep.  Dispense: 30 tablet; Refill: 2    15. Depression, unspecified depression type  -     PARoxetine (PAXIL) 40 MG tablet; Take 1 tablet by mouth Every Morning.  Dispense: 90 tablet;  Refill: 2    16. Bee sting allergy  -     EPINEPHrine (EpiPen 2-Curtis) 0.3 MG/0.3ML solution auto-injector injection; Inject 0.3 mL into the appropriate muscle as directed by prescriber 1 (One) Time for 1 dose.  Dispense: 1 each; Refill: 1    17. Gastroesophageal reflux disease, unspecified whether esophagitis present  -     pantoprazole (PROTONIX) 40 MG EC tablet; Take 1 tablet by mouth Daily.  Dispense: 90 tablet; Refill: 2    18. MIKE (obstructive sleep apnea)  -     Ambulatory Referral to Sleep Medicine    19. BPH associated with nocturia    Other orders  -     folic acid (FOLVITE) 1 MG tablet; Take 1 tablet by mouth Daily.  Dispense: 90 tablet; Refill: 2      Hypotension/Hypertension:  -vitals reassuring today  -will cautiously continue midodrine  -have placed cardiology referral    Insomnia:  -counseled on the dangers of benzo use, ivon. Given his history suggestive of dementia/delirium  -will trial ambien    Memory Concern:  -will discuss memory testing    LBP:  -I have sent a short course of the requested opiate he has been taking, but I have also placed a pain management consult to assume responsibility for pain management  -I am willing to continue to manage his gabapentin    Vaping:  -counseled today on the health risks of vaping use  -strongly counseled today on the importance of vaping cessation        Medications Discontinued During This Encounter   Medication Reason    clonazePAM (KlonoPIN) 1 MG tablet     EPINEPHrine (EPIPEN JR) 0.15 MG/0.3ML solution auto-injector injection     dapagliflozin Propanediol (Farxiga) 10 MG tablet Reorder    midodrine (PROAMATINE) 2.5 MG tablet Reorder    Dulaglutide (Trulicity) 3 MG/0.5ML solution pen-injector Reorder    gabapentin (NEURONTIN) 800 MG tablet Reorder    rosuvastatin (CRESTOR) 10 MG tablet Reorder    PARoxetine (PAXIL) 20 MG tablet Reorder    tiZANidine (ZANAFLEX) 4 MG tablet Reorder    pantoprazole (PROTONIX) 40 MG EC tablet Reorder    naloxone (NARCAN) 4  MG/0.1ML nasal spray Reorder    fenofibrate 160 MG tablet Reorder    HYDROcodone-acetaminophen (NORCO)  MG per tablet Reorder    folic acid (FOLVITE) 1 MG tablet Reorder        I spent 60 minutes caring for Hakeem on this date of service. This time includes time spent by me in the following activities:preparing for the visit, reviewing tests, obtaining and/or reviewing a separately obtained history, performing a medically appropriate examination and/or evaluation , counseling and educating the patient/family/caregiver, ordering medications, tests, or procedures, referring and communicating with other health care professionals , and documenting information in the medical record  Follow Up   Return in about 3 months (around 5/22/2024) for back pain, insomnia.  Patient was given instructions and counseling regarding his condition or for health maintenance advice. Please see specific information pulled into the AVS if appropriate.       Heriberto Rose MD  02/22/24  17:21 EST

## 2024-02-23 ENCOUNTER — PRIOR AUTHORIZATION (OUTPATIENT)
Dept: INTERNAL MEDICINE | Facility: CLINIC | Age: 74
End: 2024-02-23
Payer: MEDICARE

## 2024-02-23 PROBLEM — R80.9 MICROALBUMINURIA: Status: ACTIVE | Noted: 2024-02-23

## 2024-02-23 NOTE — PROGRESS NOTES
".\"A [employer] message has been sent to the patient for PATIENT ROUNDING with [facility]\"  "

## 2024-02-26 NOTE — TELEPHONE ENCOUNTER
NALOXONE   PA STARTED: 2/26/2024    COVER MY MEDS KEY: [id number]    WAITING ON INSURANCE REPLY    ZOLPIDEM   PA STARTED: 2/26/2024    COVER MY MEDS KEY: [id number]    WAITING ON INSURANCE REPLY

## 2024-02-29 ENCOUNTER — TELEPHONE (OUTPATIENT)
Dept: INTERNAL MEDICINE | Facility: CLINIC | Age: 74
End: 2024-02-29
Payer: MEDICARE

## 2024-02-29 NOTE — TELEPHONE ENCOUNTER
Caller: [id number] - [place], [place] - [address] AT [facility] OF [address] [facility] & [place] - [phone] [facility] [phone] FX    Relationship to patient: Pharmacy    Best call back number: [phone]    Patient is needing: PHARMACIST CALLED NEEDING CLARIFICATION ON THE PATIENTS ZOLPIDEM PRESCRIPTION. CALLER STATES PATIENT ALSO TAKES HYDROCODONE 10MG , CLONAZEPAM 1 MG TWICE A DAY, AND ALSO GABAPENTIN 800MG 4 TIMES A DAY, AND ALSO TIZANIDINE. PHARMACIST WANTING TO MAKE SURE MD JAIMES KNOWS PATIENT IS ON ALL THESE CONTROLLED SUBSTANCES BEFORE FILLING THE ZOLPIDEM.

## 2024-02-29 NOTE — TELEPHONE ENCOUNTER
I am aware of those medicines, and I am trying to simplify his regimen.  For now, I have counseled him to stop the clonazepam.

## 2024-03-04 NOTE — TELEPHONE ENCOUNTER
Caller: [id number] - [place], [place] - [address] AT [facility] OF [address] [facility] & [place] - [phone] [facility] [phone] FX    Relationship to patient: Pharmacy    Best call back number: [phone]    Patient is needing: PHARMACIST CALLED REQUESTING TO SPEAK WITH CLINICAL STAFF. CAITY STILL NEEDING TO CONFIRM IF IT IS OKAY TO FILL PATIENT AMBIEN PRESCRIPTION ALONG WITH ALL THE OTHER CONTROLLED SUBSTANCES HE IS PRESCRIBED. PHARMACY REQUESTING A CALL BACK TODAY IF POSSIBLE TO KNOW HOW TO PROCEED.

## 2024-03-27 DIAGNOSIS — M54.42 CHRONIC BILATERAL LOW BACK PAIN WITH BILATERAL SCIATICA: ICD-10-CM

## 2024-03-27 DIAGNOSIS — M54.41 CHRONIC BILATERAL LOW BACK PAIN WITH BILATERAL SCIATICA: ICD-10-CM

## 2024-03-27 DIAGNOSIS — G89.29 CHRONIC BILATERAL LOW BACK PAIN WITH BILATERAL SCIATICA: ICD-10-CM

## 2024-03-28 RX ORDER — TIZANIDINE 4 MG/1
6 TABLET ORAL NIGHTLY PRN
Qty: 90 TABLET | Refills: 2 | Status: SHIPPED | OUTPATIENT
Start: 2024-03-28

## 2024-03-31 DIAGNOSIS — M54.42 CHRONIC BILATERAL LOW BACK PAIN WITH BILATERAL SCIATICA: ICD-10-CM

## 2024-03-31 DIAGNOSIS — K21.9 GASTROESOPHAGEAL REFLUX DISEASE, UNSPECIFIED WHETHER ESOPHAGITIS PRESENT: ICD-10-CM

## 2024-03-31 DIAGNOSIS — M54.41 CHRONIC BILATERAL LOW BACK PAIN WITH BILATERAL SCIATICA: ICD-10-CM

## 2024-03-31 DIAGNOSIS — E78.5 HYPERLIPIDEMIA, UNSPECIFIED HYPERLIPIDEMIA TYPE: ICD-10-CM

## 2024-03-31 DIAGNOSIS — G89.29 CHRONIC BILATERAL LOW BACK PAIN WITH BILATERAL SCIATICA: ICD-10-CM

## 2024-04-01 RX ORDER — PANTOPRAZOLE SODIUM 40 MG/1
40 TABLET, DELAYED RELEASE ORAL DAILY
Qty: 90 TABLET | Refills: 2 | Status: SHIPPED | OUTPATIENT
Start: 2024-04-01

## 2024-04-01 RX ORDER — FENOFIBRATE 160 MG/1
160 TABLET ORAL DAILY
Qty: 90 TABLET | Refills: 2 | Status: SHIPPED | OUTPATIENT
Start: 2024-04-01

## 2024-04-01 RX ORDER — ROSUVASTATIN CALCIUM 10 MG/1
10 TABLET, COATED ORAL DAILY
Qty: 90 TABLET | Refills: 2 | Status: SHIPPED | OUTPATIENT
Start: 2024-04-01

## 2024-04-01 NOTE — TELEPHONE ENCOUNTER
LAST APPOINTMENT: 02/22/2024  NEXT APPOINTMENT: 05/24/2024  LAST UDS: 02/22/2024  LAST CONTROLLED SUBSTANCE AGREEMENT: 02/26/2024

## 2024-04-02 RX ORDER — GABAPENTIN 800 MG/1
800 TABLET ORAL 3 TIMES DAILY
Qty: 90 TABLET | Refills: 2 | Status: SHIPPED | OUTPATIENT
Start: 2024-04-02

## 2024-04-05 ENCOUNTER — PATIENT ROUNDING (BHMG ONLY) (OUTPATIENT)
Dept: CARDIOLOGY | Facility: CLINIC | Age: 74
End: 2024-04-05
Payer: MEDICARE

## 2024-04-15 ENCOUNTER — TELEPHONE (OUTPATIENT)
Dept: INTERNAL MEDICINE | Facility: CLINIC | Age: 74
End: 2024-04-15
Payer: MEDICARE

## 2024-04-15 NOTE — TELEPHONE ENCOUNTER
Reviewed.  I will call pharmacist as soon as I am able.    In the meantime, please contact Mr. WanSharma to schedule an appointment for sometime in the next week to discuss his overuse of this medicine.

## 2024-04-15 NOTE — TELEPHONE ENCOUNTER
Caller: [id number] - [place], [place] - [address] AT [facility] [address] & [place] - [phone] [facility] [phone] FX    Relationship to patient: Pharmacy    Best call back number: [phone]    Patient is needing: PHARMACIST CALLED REQUESTING TO SPEAK WITH CLINICAL STAFF. PHARMACIST STATES PATIENT CALLED HER ON SATURDAY TO REFILL HIS PRESCRIPTION FOR TIZANIDINE. CALLER STATES SHE HAD TOLD THE PATIENT IS WAS TOO EARLY FOR A REFILL AND ASKED HOW HAD HE BEEN TAKING THIS MEDICATION AND THE PATIENT INFORMED THE PHARMACIST HE HAD BEEN TAKING 4 TABLETS DAILY. PHARMACIST EXPLAINED TO THE PATIENT THAT TAKING 24MG OF THIS MEDICATION WITH ALL HIS OTHER LISTED MEDICINES IS EXTREMELY UNSAFE AND THE PATIENT RESPONDED THAT HE DID NOT CARE IT WAS WHAT HE NEEDED TO KEEP HIS BACK FROM HURTING. PATIENT THEN ASKED IF HE COULD PAY OUT OF POCKET FOR AN EARLY REFILL ON HIS TIZANIDINE, PHARMACIST DECLINED AND TOLD PATIENT SHE WOULD NEED TO SPEAK WITH MD JAIMES BEFORE REFILLING ANY MEDICINES. PHARMACIST STATES PATIENT SOUNDED VERY OUT OF IT ON THE PHONE AND SHE IS CONCERNED. WOULD LIKE A CALL BACK TODAY AS SOON AS POSSIBLE.

## 2024-04-15 NOTE — TELEPHONE ENCOUNTER
Spoke with Alyson myers).  I confirmed the message below.  I told her that he is not approved for early refills, and I will try to speak with him in the near term about his overuse of this medicine.  She voiced concern, stating that he didn't sound sober and behaved bizarrely when speaking to her.  She further stated that tizanidine can potentially be crushed and snorted, and that he is on a very complex regimen currently.  I responded that my first goal has been to stop his clonazepam, and that I was attempting to do this before simplifying the rest of his regimen.  I also stated that he will need to take medicines as prescribed if he wishes to continue seeing me as his PCP.

## 2024-04-15 NOTE — TELEPHONE ENCOUNTER
Attempted to contact patient. Left message with granddaughter on the phone for the patient to call our office  because the patient was not available at the time of the call.     HUB OK TO READ/ADVISE:  Per Dr. Rose -  Please contact Mr. Sharma to schedule an appointment for sometime in the next week to discuss his overuse of this medicine.

## 2024-04-18 ENCOUNTER — OFFICE VISIT (OUTPATIENT)
Dept: INTERNAL MEDICINE | Facility: CLINIC | Age: 74
End: 2024-04-18
Payer: MEDICARE

## 2024-04-18 VITALS
DIASTOLIC BLOOD PRESSURE: 72 MMHG | HEART RATE: 73 BPM | WEIGHT: 169.5 LBS | OXYGEN SATURATION: 95 % | SYSTOLIC BLOOD PRESSURE: 108 MMHG | TEMPERATURE: 96.7 F | HEIGHT: 69 IN | BODY MASS INDEX: 25.1 KG/M2

## 2024-04-18 DIAGNOSIS — G47.00 INSOMNIA, UNSPECIFIED TYPE: ICD-10-CM

## 2024-04-18 DIAGNOSIS — R41.3 MEMORY PROBLEM: ICD-10-CM

## 2024-04-18 DIAGNOSIS — Z91.148 OVERUSE OF MEDICATION: ICD-10-CM

## 2024-04-18 DIAGNOSIS — M54.42 CHRONIC BILATERAL LOW BACK PAIN WITH BILATERAL SCIATICA: Primary | ICD-10-CM

## 2024-04-18 DIAGNOSIS — R63.4 WEIGHT LOSS: ICD-10-CM

## 2024-04-18 DIAGNOSIS — E11.65 TYPE 2 DIABETES MELLITUS WITH HYPERGLYCEMIA, UNSPECIFIED WHETHER LONG TERM INSULIN USE: ICD-10-CM

## 2024-04-18 DIAGNOSIS — Z79.899 MEDICATION MANAGEMENT: ICD-10-CM

## 2024-04-18 DIAGNOSIS — R79.89 LOW TESTOSTERONE IN MALE: ICD-10-CM

## 2024-04-18 DIAGNOSIS — R29.6 FREQUENT FALLS: ICD-10-CM

## 2024-04-18 DIAGNOSIS — R54 AGE-RELATED PHYSICAL DEBILITY: ICD-10-CM

## 2024-04-18 DIAGNOSIS — G89.29 CHRONIC BILATERAL LOW BACK PAIN WITH BILATERAL SCIATICA: Primary | ICD-10-CM

## 2024-04-18 DIAGNOSIS — I95.9 HYPOTENSION, UNSPECIFIED HYPOTENSION TYPE: ICD-10-CM

## 2024-04-18 DIAGNOSIS — M54.41 CHRONIC BILATERAL LOW BACK PAIN WITH BILATERAL SCIATICA: Primary | ICD-10-CM

## 2024-04-18 DIAGNOSIS — Z12.5 SCREENING PSA (PROSTATE SPECIFIC ANTIGEN): ICD-10-CM

## 2024-04-18 DIAGNOSIS — M79.671 FOOT PAIN, BILATERAL: ICD-10-CM

## 2024-04-18 DIAGNOSIS — M79.672 FOOT PAIN, BILATERAL: ICD-10-CM

## 2024-04-18 LAB
AMPHET+METHAMPHET UR QL: NEGATIVE
AMPHETAMINE INTERNAL CONTROL: ABNORMAL
AMPHETAMINES UR QL: NEGATIVE
BARBITURATE INTERNAL CONTROL: ABNORMAL
BARBITURATES UR QL SCN: NEGATIVE
BASOPHILS # BLD AUTO: 0.05 10*3/MM3 (ref 0–0.2)
BASOPHILS NFR BLD AUTO: 1.1 % (ref 0–1.5)
BENZODIAZ UR QL SCN: NEGATIVE
BENZODIAZEPINE INTERNAL CONTROL: ABNORMAL
BUPRENORPHINE INTERNAL CONTROL: ABNORMAL
BUPRENORPHINE SERPL-MCNC: NEGATIVE NG/ML
CANNABINOIDS SERPL QL: POSITIVE
COCAINE INTERNAL CONTROL: ABNORMAL
COCAINE UR QL: NEGATIVE
DEPRECATED RDW RBC AUTO: 41.9 FL (ref 37–54)
EOSINOPHIL # BLD AUTO: 0.11 10*3/MM3 (ref 0–0.4)
EOSINOPHIL NFR BLD AUTO: 2.4 % (ref 0.3–6.2)
ERYTHROCYTE [DISTWIDTH] IN BLOOD BY AUTOMATED COUNT: 12.7 % (ref 12.3–15.4)
HCT VFR BLD AUTO: 44.7 % (ref 37.5–51)
HGB BLD-MCNC: 15.1 G/DL (ref 13–17.7)
IMM GRANULOCYTES # BLD AUTO: 0.01 10*3/MM3 (ref 0–0.05)
IMM GRANULOCYTES NFR BLD AUTO: 0.2 % (ref 0–0.5)
LYMPHOCYTES # BLD AUTO: 0.95 10*3/MM3 (ref 0.7–3.1)
LYMPHOCYTES NFR BLD AUTO: 20.5 % (ref 19.6–45.3)
MCH RBC QN AUTO: 30.5 PG (ref 26.6–33)
MCHC RBC AUTO-ENTMCNC: 33.8 G/DL (ref 31.5–35.7)
MCV RBC AUTO: 90.3 FL (ref 79–97)
MDMA (ECSTASY) INTERNAL CONTROL: ABNORMAL
MDMA UR QL SCN: NEGATIVE
METHADONE INTERNAL CONTROL: ABNORMAL
METHADONE UR QL SCN: NEGATIVE
METHAMPHETAMINE INTERNAL CONTROL: ABNORMAL
MONOCYTES # BLD AUTO: 0.45 10*3/MM3 (ref 0.1–0.9)
MONOCYTES NFR BLD AUTO: 9.7 % (ref 5–12)
MORPHINE INTERNAL CONTROL: ABNORMAL
MORPHINE/OPIATES SCREEN, URINE: POSITIVE
NEUTROPHILS NFR BLD AUTO: 3.07 10*3/MM3 (ref 1.7–7)
NEUTROPHILS NFR BLD AUTO: 66.1 % (ref 42.7–76)
NRBC BLD AUTO-RTO: 0 /100 WBC (ref 0–0.2)
OXYCODONE INTERNAL CONTROL: ABNORMAL
OXYCODONE UR QL SCN: NEGATIVE
PCP UR QL SCN: NEGATIVE
PHENCYCLIDINE INTERNAL CONTROL: ABNORMAL
PLATELET # BLD AUTO: 147 10*3/MM3 (ref 140–450)
PMV BLD AUTO: 11.5 FL (ref 6–12)
PSA SERPL-MCNC: 1.69 NG/ML (ref 0–4)
RBC # BLD AUTO: 4.95 10*6/MM3 (ref 4.14–5.8)
TESTOST SERPL-MCNC: 342 NG/DL (ref 193–740)
THC INTERNAL CONTROL: ABNORMAL
WBC NRBC COR # BLD AUTO: 4.64 10*3/MM3 (ref 3.4–10.8)

## 2024-04-18 PROCEDURE — 84403 ASSAY OF TOTAL TESTOSTERONE: CPT | Performed by: STUDENT IN AN ORGANIZED HEALTH CARE EDUCATION/TRAINING PROGRAM

## 2024-04-18 PROCEDURE — 85025 COMPLETE CBC W/AUTO DIFF WBC: CPT | Performed by: STUDENT IN AN ORGANIZED HEALTH CARE EDUCATION/TRAINING PROGRAM

## 2024-04-18 PROCEDURE — G0103 PSA SCREENING: HCPCS | Performed by: STUDENT IN AN ORGANIZED HEALTH CARE EDUCATION/TRAINING PROGRAM

## 2024-04-18 RX ORDER — CLONAZEPAM 1 MG/1
0.5 TABLET ORAL NIGHTLY PRN
COMMUNITY
Start: 2024-01-02

## 2024-04-18 NOTE — PROGRESS NOTES
Chief Complaint  Back Pain, Medication Problem (Questions about gabapentin and tizanidine   ), and Weight Loss (Patient has questions about why he is losing weight and what he can do to help keep his weight and strength )    Subjective          Hakeem Sharma presents to [facility]  History of Present Illness    Here with granddaughter and family friend.    History provided by Mr. Sharma, with supplementary history from granddaughter and to a lesser extent family friend    Here today to discuss recent communication I received from [facility] pharmacy (Mr. Sharma was taking more tizanidine than he was prescribed, he asked for an early refill, and when talking with the pharmacist he appeared inebriated).  Mr. Sharma reports he was prescribed 6 mg every 4-6 hours previously, and that it helps alleviate his foot pain.    He is now using THC containing gummies, and reports that pain management is aware of this.    He is now taking 0.25 mg of clonazepam most days.  Per family friend, they have 8 whole tablets, a few half tables, and a few quarter tabs.    Granddaughter reports that the reason they no showed sleep medicine and cardiology in the interim is that Mr. Sharma refused to go that day.  Mr. Sharma does not remember but does not dispute that this occurred.  She also reports that he will often forget that he had just taken his medicine.  She reports that he is frequently up all night.  Ambien does at times help with sleep.    One thing that grandlisaughter was unaware of until recently was that he is on supplemental testosterone.    Reports eating 2-3 times a day.  Not taking supplemental shakes currently.    PHQ-9 Depression Screening  Little interest or pleasure in doing things? 0-->not at all   Feeling down, depressed, or hopeless? 0-->not at all   Trouble falling or staying asleep, or sleeping too much?     Feeling tired or having little energy?     Poor appetite  or overeating?     Feeling bad about yourself - or that you are a failure or have let yourself or your family down?     Trouble concentrating on things, such as reading the newspaper or watching television?     Moving or speaking so slowly that other people could have noticed? Or the opposite - being so fidgety or restless that you have been moving around a lot more than usual?     Thoughts that you would be better off dead, or of hurting yourself in some way?     PHQ-9 Total Score 0   If you checked off any problems, how difficult have these problems made it for you to do your work, take care of things at home, or get along with other people?           Current Outpatient Medications   Medication Instructions    clonazePAM (KLONOPIN) 0.5 mg, Oral, Nightly PRN    dapagliflozin Propanediol (FARXIGA) 10 mg, Oral, Daily    fenofibrate 160 mg, Oral, Daily    folic acid (FOLVITE) 1 mg, Oral, Daily    gabapentin (NEURONTIN) 800 mg, Oral, 3 Times Daily    HYDROcodone-acetaminophen (NORCO)  MG per tablet 1 tablet, Oral, Every 6 Hours PRN    levocetirizine (XYZAL) 5 mg, Every Evening    midodrine (PROAMATINE) 2.5 mg, Oral, 3 Times Daily Before Meals    multivitamin with minerals tablet tablet 1 tablet, Oral, Daily    naloxone (NARCAN) 4 MG/0.1ML nasal spray 1 spray, Nasal, As Needed    pantoprazole (PROTONIX) 40 mg, Oral, Daily    PARoxetine (PAXIL) 40 mg, Oral, Every Morning    rosuvastatin (CRESTOR) 10 mg, Oral, Daily    sildenafil (VIAGRA) 25 mg, Daily PRN    tiZANidine (ZANAFLEX) 6 mg, Oral, Nightly PRN    Trulicity 4.5 mg, Subcutaneous, Weekly    zolpidem (AMBIEN) 5 mg, Oral, Nightly PRN       The following portions of the patient's history were reviewed and updated as appropriate: allergies, current medications, past family history, past medical history, past social history, past surgical history, and problem list.    Objective   Vital Signs:   /72 (BP Location: Right arm, Patient Position: Sitting, Cuff  "Size: Adult)   Pulse 73   Temp 96.7 °F (35.9 °C) (Temporal)   Ht 175.3 cm (69\")   Wt 76.9 kg (169 lb 8 oz)   SpO2 95%   BMI 25.03 kg/m²     BP Readings from Last 3 Encounters:   04/18/24 108/72   02/22/24 130/84   02/08/24 168/86     Wt Readings from Last 3 Encounters:   04/18/24 76.9 kg (169 lb 8 oz)   02/22/24 81.2 kg (179 lb)   02/08/24 81.6 kg (180 lb)           Physical Exam  Vitals reviewed.   Constitutional:       General: He is not in acute distress.     Appearance: Normal appearance. He is not ill-appearing, toxic-appearing or diaphoretic.   HENT:      Head: Normocephalic and atraumatic.      Right Ear: External ear normal.      Left Ear: External ear normal.   Eyes:      Conjunctiva/sclera: Conjunctivae normal.   Cardiovascular:      Rate and Rhythm: Normal rate and regular rhythm.      Pulses: Normal pulses.      Heart sounds: Normal heart sounds. No murmur heard.     No friction rub. No gallop.   Pulmonary:      Effort: Pulmonary effort is normal. No respiratory distress.      Breath sounds: Normal breath sounds. No stridor. No wheezing, rhonchi or rales.   Chest:      Chest wall: No tenderness.   Abdominal:      General: Abdomen is flat.      Palpations: Abdomen is soft. There is no mass.      Tenderness: There is no abdominal tenderness.   Musculoskeletal:      Right lower leg: No edema.      Left lower leg: No edema.   Skin:     General: Skin is warm and dry.   Neurological:      General: No focal deficit present.      Mental Status: He is alert and oriented to person, place, and time. Mental status is at baseline.   Psychiatric:         Behavior: Behavior normal.         Thought Content: Thought content normal.         Judgment: Judgment normal.      Result Review :   The following data was reviewed by: Heriberto Rose MD on 04/18/2024:  Common labs          2/8/2024    13:39 2/22/2024    15:28   Common Labs   Glucose 145  114    BUN 22  29    Creatinine 0.89  1.16    Sodium 142  143  "   Potassium 3.6  4.7    Chloride 104  106    Calcium 9.6  9.6    Albumin 4.2  4.8    Total Bilirubin 0.5  0.3    Alkaline Phosphatase 38  41    AST (SGOT) 16  21    ALT (SGPT) 15  14    WBC 6.44     Hemoglobin 15.0     Hematocrit 44.0     Platelets 146     Hemoglobin A1C  6.70    Microalbumin, Urine  5.0             Lab Results   Component Value Date    BILIRUBINUR Negative 02/08/2024       Procedures        Assessment and Plan    Diagnoses and all orders for this visit:    1. Chronic bilateral low back pain with bilateral sciatica (Primary)    2. Overuse of medication    3. Frequent falls    4. Memory problem    5. Age-related physical debility    6. Medication management  -     POC Medline 12 Panel Urine Drug Screen    7. Insomnia, unspecified type    8. Screening PSA (prostate specific antigen)  -     PSA Screen    9. Low testosterone in male  -     Testosterone  -     CBC & Differential  -     PSA Screen    10. Foot pain, bilateral    11. Hypotension, unspecified hypotension type  -     Ambulatory Referral to Cardiology    12. Weight loss    13. Type 2 diabetes mellitus with hyperglycemia, unspecified whether long term insulin use      Chronic bilateral LBP, Foot pain:  -now [facility] with [facility]  -UDS positive for opiates as expected, and THC which was unexpected  -I discussed the importance of taking medicines as prescribed, as well as my concerns that his regimen is contributing to his issues of memory as well as increasing his fall risk  -I advised Mr. Sharma that he will need to ask pain management to assume management of his zanaflex (tizanidine), as I will no longer be prescribing this medicine for him    Benzo use:  -currently down to 0.25 mg, which he takes most nights.  Conservatively, he reportedly has at least 20 days at current dosing  -am trying to wean Mr. Sharma off this medicine, and will continue with ambien for treatment of his insomnia    Frequent falls, physical  debility:  -happily,no interim falls    Weight loss:  -recommended glucerna supplemental shakes, as well as the importance of eating 3 meals a day    Testosterone:  -discussed risks and benefits of supplemental testosterone  -risks include increased risk of prostate cancer as well as polycythemia  -labs today    There are no discontinued medications.       Follow Up   Return if symptoms worsen or fail to improve.  Will keep 5/24/24 appointment.  Patient was given instructions and counseling regarding his condition or for health maintenance advice. Please see specific information pulled into the AVS if appropriate.       Heriberto Rose MD  04/18/24  17:52 EDT

## 2024-04-19 DIAGNOSIS — K21.9 GASTROESOPHAGEAL REFLUX DISEASE, UNSPECIFIED WHETHER ESOPHAGITIS PRESENT: ICD-10-CM

## 2024-04-19 DIAGNOSIS — F32.A DEPRESSION, UNSPECIFIED DEPRESSION TYPE: ICD-10-CM

## 2024-04-22 RX ORDER — PAROXETINE HYDROCHLORIDE 40 MG/1
40 TABLET, FILM COATED ORAL EVERY MORNING
Qty: 90 TABLET | Refills: 2 | OUTPATIENT
Start: 2024-04-22

## 2024-04-22 RX ORDER — PANTOPRAZOLE SODIUM 40 MG/1
40 TABLET, DELAYED RELEASE ORAL DAILY
Qty: 90 TABLET | Refills: 2 | OUTPATIENT
Start: 2024-04-22

## 2024-05-24 ENCOUNTER — TELEPHONE (OUTPATIENT)
Dept: INTERNAL MEDICINE | Facility: CLINIC | Age: 74
End: 2024-05-24

## 2024-05-28 ENCOUNTER — TELEPHONE (OUTPATIENT)
Dept: INTERNAL MEDICINE | Facility: CLINIC | Age: 74
End: 2024-05-28

## 2024-05-28 NOTE — TELEPHONE ENCOUNTER
Pharmacy Name:  [facility] IN [place]    Pharmacy representative name: CARMELLA    Pharmacy representative phone number: [phone]     What medication are you calling in regards to:     tiZANidine (ZANAFLEX) 4 MG tablet     What question does the pharmacy have: WANTED TO MAKE THE OFFICE AWARE    Who is the provider that prescribed the medication: DR JAIMES     Additional notes: PHARMACIST STATES THAT THEY GOT A NOTICE ABOUT A PRESCRIPTION FOR TIZANIDINE FROM A PROVIDER NAMED DR CLAIRE VILA IN [place] FOR A 6 MG TABLET BUT THEY HAD ALSO RECEIVED A NOTE FROM DR JAIMES'S OFFICE TO NOT FILL     tiZANidine (ZANAFLEX) 4 MG tablet   EARLY.     SHE STATES THAT THE 6 MG PRESCRIPTION WAS ORIGINALLY FROM [facility] AND THAT THE PATIENT STILL HAD REFILLS SO HE CALLED IT IN.     SHE STATES THAT YOU CAN CALL BACK AT THE MAIN PHARMACY NUMBER WITH ANY QUESTIONS.

## 2024-11-15 DIAGNOSIS — Z91.030 BEE STING ALLERGY: ICD-10-CM

## 2024-11-15 RX ORDER — EPINEPHRINE 0.3 MG/.3ML
0.3 INJECTION SUBCUTANEOUS ONCE AS NEEDED
Qty: 1 EACH | Refills: 1 | Status: SHIPPED | OUTPATIENT
Start: 2024-11-15

## 2024-11-15 RX ORDER — EPINEPHRINE 0.3 MG/.3ML
INJECTION SUBCUTANEOUS
Qty: 2 EACH | OUTPATIENT
Start: 2024-11-15

## 2024-12-01 DIAGNOSIS — G89.29 CHRONIC BILATERAL LOW BACK PAIN WITH BILATERAL SCIATICA: ICD-10-CM

## 2024-12-01 DIAGNOSIS — M54.41 CHRONIC BILATERAL LOW BACK PAIN WITH BILATERAL SCIATICA: ICD-10-CM

## 2024-12-01 DIAGNOSIS — M54.42 CHRONIC BILATERAL LOW BACK PAIN WITH BILATERAL SCIATICA: ICD-10-CM
